# Patient Record
Sex: FEMALE | Race: ASIAN | URBAN - METROPOLITAN AREA
[De-identification: names, ages, dates, MRNs, and addresses within clinical notes are randomized per-mention and may not be internally consistent; named-entity substitution may affect disease eponyms.]

---
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
## 2025-03-07 ENCOUNTER — TRANSFERRED RECORDS (OUTPATIENT)
Dept: HEALTH INFORMATION MANAGEMENT | Facility: CLINIC | Age: 25
End: 2025-03-07

## 2025-03-14 ENCOUNTER — TRANSFERRED RECORDS (OUTPATIENT)
Dept: HEALTH INFORMATION MANAGEMENT | Facility: CLINIC | Age: 25
End: 2025-03-14

## 2025-03-26 ENCOUNTER — MEDICAL CORRESPONDENCE (OUTPATIENT)
Dept: HEALTH INFORMATION MANAGEMENT | Facility: CLINIC | Age: 25
End: 2025-03-26
Payer: COMMERCIAL

## 2025-03-27 ENCOUNTER — TRANSCRIBE ORDERS (OUTPATIENT)
Dept: OTHER | Age: 25
End: 2025-03-27

## 2025-03-27 DIAGNOSIS — B25.9 CMV COLITIS (H): Primary | ICD-10-CM

## 2025-03-27 DIAGNOSIS — A08.39 CMV COLITIS (H): Primary | ICD-10-CM

## 2025-03-27 DIAGNOSIS — B25.9 CYTOMEGALOVIRAL DISEASE, UNSPECIFIED (H): ICD-10-CM

## 2025-03-31 ENCOUNTER — TRANSFERRED RECORDS (OUTPATIENT)
Dept: HEALTH INFORMATION MANAGEMENT | Facility: CLINIC | Age: 25
End: 2025-03-31
Payer: COMMERCIAL

## 2025-03-31 NOTE — CONFIDENTIAL NOTE
DIAGNOSIS:    CMV colitis (H)   Cytomegaloviral disease, unspecified      DATE RECEIVED:  04.22.2025    NOTES (Gather within 2 years) STATUS DETAILS   OFFICE NOTE from referring provider   In process Kimani Stokes MD  [facility]    OFFICE NOTE from other specialist     DISCHARGE SUMMARY from hospital     DISCHARGE REPORT from the ER     LABS (any labs) Care Everywhere    MEDICATION LIST     IMAGING  (NEED IMAGES AND REPORTS)     Osteomyelitis: Foot imaging      Liver Abscess: Abdominal imaging     Other (anything related to diagnoses

## 2025-04-22 ENCOUNTER — OFFICE VISIT (OUTPATIENT)
Dept: INFECTIOUS DISEASES | Facility: CLINIC | Age: 25
End: 2025-04-22
Attending: STUDENT IN AN ORGANIZED HEALTH CARE EDUCATION/TRAINING PROGRAM
Payer: COMMERCIAL

## 2025-04-22 ENCOUNTER — PRE VISIT (OUTPATIENT)
Dept: INFECTIOUS DISEASES | Facility: CLINIC | Age: 25
End: 2025-04-22
Payer: COMMERCIAL

## 2025-04-22 VITALS
WEIGHT: 109 LBS | DIASTOLIC BLOOD PRESSURE: 57 MMHG | HEART RATE: 56 BPM | BODY MASS INDEX: 18.16 KG/M2 | SYSTOLIC BLOOD PRESSURE: 93 MMHG | HEIGHT: 65 IN | OXYGEN SATURATION: 100 %

## 2025-04-22 DIAGNOSIS — A08.39 CMV COLITIS (H): Primary | ICD-10-CM

## 2025-04-22 DIAGNOSIS — K50.911 CROHN'S DISEASE WITH RECTAL BLEEDING, UNSPECIFIED GASTROINTESTINAL TRACT LOCATION (H): ICD-10-CM

## 2025-04-22 DIAGNOSIS — B25.9 CMV COLITIS (H): Primary | ICD-10-CM

## 2025-04-22 DIAGNOSIS — Z91.018 FOOD ALLERGY: ICD-10-CM

## 2025-04-22 PROCEDURE — 3074F SYST BP LT 130 MM HG: CPT | Performed by: STUDENT IN AN ORGANIZED HEALTH CARE EDUCATION/TRAINING PROGRAM

## 2025-04-22 PROCEDURE — 99213 OFFICE O/P EST LOW 20 MIN: CPT | Performed by: STUDENT IN AN ORGANIZED HEALTH CARE EDUCATION/TRAINING PROGRAM

## 2025-04-22 PROCEDURE — 3078F DIAST BP <80 MM HG: CPT | Performed by: STUDENT IN AN ORGANIZED HEALTH CARE EDUCATION/TRAINING PROGRAM

## 2025-04-22 PROCEDURE — 99204 OFFICE O/P NEW MOD 45 MIN: CPT | Performed by: STUDENT IN AN ORGANIZED HEALTH CARE EDUCATION/TRAINING PROGRAM

## 2025-04-22 NOTE — PROGRESS NOTES
[facility]  General Infectious Disease Initial Consult Note     Patient:  Zeenat Padilla, Date of birth 2000, Medical record number [id number]  Date of Visit:  April 22, 2025  Consult Requested by: [facility]  Reason for Consult: CMV colitis         Assessment and Recommendations:   Problem List:  CMV colitis  Crohn's disease -currently not on medication  Arthritis of unknown etiology    Impression: Zeenat Padilla is a 25 yo F with new diagnosis of crohn's disease, not currently on medication who presented with severe abdominal pain -found to have CMV inclusions on pathology. Also with severe arthritis that may be related to Crohn's disease.     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 She has completed 3 weeks of valgancyclovir and her abdominal pain has resolved. Would obtain CMV VL to confirm that she no longer has infection. If positive, would need pregnancy test, restart valgancyclovir, and weekly CMV VL monitoring with CBC and Chem 7.     It's not clear to me why she developed CMV colitis. I assume this is secondary reactivation, but she wasn't on prednisone longterm to precipitate this type of infection. No fevers, chills, or infectious signs/symptoms beyond her abdominal pain and arthritis. No sick contact, or other known exposures.     Finally, she has noted a worsening of her arthritis symptoms in relation to food. Specifically, she mentions that within 30 minutes of eating certain types of foods, her joint symptoms worsen, but then self-resolve. I encouraged her to keep a food diary to notice patterns about specific triggers. I will refer her to allergy for further consideration.       Recommendations:  Obtain CMV Viral load -if negative, no further testing. If positive, would restart Valgancyclovir and monitor her weekly with viral load, CBC, Chem 7  Food allergy referral per her request      Attestation:  I have reviewed today's vital signs, medications, labs and  imaging.  Gemma Villagomez MD  Pager [phone]         History of Present Illness:       Zeenat Padilla is a 23 yo F who was referred to ID clinic after a recent diagnosis of CMV colitis.     She has had symptoms of rectal bleeding for approximately 1 year. Work up has included negative testing for Celiac. She also has symptoms of swelling in her dorsal hand that would last ~3 days. This symptoms would come on and off every 1-2 months, and then self-resolve.     In November 2024 she had swelling of her fingers and she presented to her PCP. In January 2025 she had severe swelling of her hands, feet, knees, and feet, that was so bad she couldn't get her shoes on. She couldn't close her hand, it was so swollen.     Along with this she developed severe abdominal pain, which she describes as like menstrual cramps but 100x worse. She believes the pain was in the lower and central abdomen. She had a colonoscopy on 3/7 that was suggestive of crohn's disease.     Specifically, path was notable for patchy moderately active chronic colitis consistent with Crohn's disease. Cecum and rectal biopsies showed high density of CMV infected cells (>5 infected cells).     She was started on Valgancyclovir on 3/14 for 3 weeks, and she has completed that course. Her abdominal pain has since resolved, around the time that she completed the valgancyclovir. She had weekly labs while on valgancyclovir. She can show me lab results on her phone (4/10: AST/ALT normal, WBC 5.9). No CMV VL was done (from what I can see). Unfortunately I am unable to see her outside labs done at [facility].     She was started on Mesalamine after her colonoscopy, but stopped it on April 7th. Due to severe joint swelling, she started prednisone on April 8th, 9th, 10th, but she stopped due to no improvement. Thus, at this point, she is not on any medications.     She feels that her symptoms may be related to food. She thinks sometimes after eating sushi she has joint  swelling for 30 minutes that self-resolves. She is requesting an allergy referral.            Review of Systems:   CONSTITUTIONAL:  No fevers or chills  EYES: negative for icterus  ENT:  negative for oral lesions, hearing loss, tinnitus and sore throat  RESPIRATORY:  negative for cough and dyspnea  CARDIOVASCULAR:  negative for chest pain, palpitations  GASTROINTESTINAL:  negative for nausea, vomiting, diarrhea and constipation  GENITOURINARY:  negative for dysuria  HEME:  No easy bruising/bleeding  INTEGUMENT:  negative for rash and pruritus  NEURO:  Negative for headache       Past Medical History:   No past medical history on file.  None      Allergies:      Allergies   Allergen Reactions    Amoxicillin Hives    Azithromycin Hives    Penicillins             Current Antimicrobials:     None       Family History:     Family History   Problem Relation Age of Onset    Glaucoma No family hx of     Macular Degeneration No family hx of      No family history of autoimmune conditions or recurrent ifnections       Social History:     Social History     Socioeconomic History    Marital status: Single     Spouse name: Not on file    Number of children: Not on file    Years of education: Not on file    Highest education level: Not on file   Occupational History    Not on file   Tobacco Use    Smoking status: Never    Smokeless tobacco: Not on file   Substance and Sexual Activity    Alcohol use: Not on file    Drug use: Not on file    Sexual activity: Not on file   Other Topics Concern    Not on file   Social History Narrative    Not on file     Social Drivers of Health     Financial Resource Strain: Low Risk  (11/2/2024)    Received from [employer] [facility]    Financial Resource Strain     Difficulty of Paying Living Expenses: 3     Difficulty of Paying Living Expenses: Not on file   Food Insecurity: No Food Insecurity (11/2/2024)    Received from [employer] [facility]    Food  Insecurity     Do you worry your food will run out before you are able to buy more?: 1   Transportation Needs: No Transportation Needs (11/2/2024)    Received from [employer] [facility]    Transportation Needs     Does lack of transportation keep you from medical appointments?: 1     Does lack of transportation keep you from work, meetings or getting things that you need?: 1   Physical Activity: Not on file   Stress: Not on file   Social Connections: Socially Isolated (11/2/2024)    [facility]    Social Connections     Do you often feel lonely or isolated from those around you?: 4   Interpersonal Safety: Not on file   Housing Stability: Low Risk  (11/2/2024)    [facility]    Housing Stability     What is your housing situation today?: 1     She used to work at [employer], 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 She lives in [place] with her parents and her uncle. No pets. Non smoker, no alcohol. She is sexually active with 1 male partner.   No travel outside of the country.          Physical Exam:   Ranges forvital signs:  Pulse:  [56] 56  BP: (93)/(57) 93/57  SpO2:  [100 %] 100 %  [unfilled]    Exam:  GENERAL:  well-developed, well-nourished, in no acute distress.   ENT:  Head is normocephalic, atraumatic. Oropharynx is moist without exudates or ulcers.  EYES:  Eyes have anicteric sclerae.  PERRL.  NECK:  Supple. No cervical lymphadenopathy.   LUNGS:  Clear to auscultation bilaterally. No wheezes or crackles  CARDIOVASCULAR:  Regular rate and rhythm with no murmurs, gallops or rubs.  ABDOMEN:  Normal bowel sounds, soft, nontender. No hepatosplenomegaly.   EXT: Extremities warm and without edema. R achilles nodule present, she says this has arisen in the last 24 hours.   SKIN:  No acute rashes.    NEUROLOGIC:  Grossly  "nonfocal.           Laboratory Data:   No results found for: \"CR\", \"WBC\", \"HGB\", \"PLT\"  No results found for: \"NA\", \"CREATININE\", \"BUN\", \"CO2\"    2/10 UA normal  2/10 GC chlamydia urine probe negative  2/10 Quantiferon TB negative  Hepatitis B SAg, Core Ab negative  Hep C Ab negative  CRP <3    1/16 Lyme negative, TPA Ab negative    2/26/24 HIV Ab negative          "

## 2025-04-22 NOTE — LETTER
4/22/2025       RE: Zeenat Padilla  [address]  [place] [address]     Dear Colleague,    Thank you for referring your patient, Zeenat Padilla, to the [facility] INFECTIOUS DISEASE CLINIC [place] at [facility]. Please see a copy of my visit note below.    [facility]  General Infectious Disease Initial Consult Note     Patient:  Zeenat Padilla, Date of birth 2000, Medical record number [id number]  Date of Visit:  April 22, 2025  Consult Requested by: [facility]  Reason for Consult: CMV colitis         Assessment and Recommendations:   Problem List:  CMV colitis  Crohn's disease -currently not on medication  Arthritis of unknown etiology    Impression: Zeenat Padilla is a 25 yo F with new diagnosis of crohn's disease, not currently on medication who presented with severe abdominal pain -found to have CMV inclusions on pathology. Also with severe arthritis that may be related to Crohn's disease.     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 She has completed 3 weeks of valgancyclovir and her abdominal pain has resolved. Would obtain CMV VL to confirm that she no longer has infection. If positive, would need pregnancy test, restart valgancyclovir, and weekly CMV VL monitoring with CBC and Chem 7.     It's not clear to me why she developed CMV colitis. I assume this is secondary reactivation, but she wasn't on prednisone longterm to precipitate this type of infection. No fevers, chills, or infectious signs/symptoms beyond her abdominal pain and arthritis. No sick contact, or other known exposures.     Finally, she has noted a worsening of her arthritis symptoms in relation to food. Specifically, she mentions that within 30 minutes of eating certain types of foods, her joint symptoms worsen, but then self-resolve. I encouraged her to keep a food diary to notice patterns about specific triggers. I will refer her to allergy for further  consideration.       Recommendations:  Obtain CMV Viral load -if negative, no further testing. If positive, would restart Valgancyclovir and monitor her weekly with viral load, CBC, Chem 7  Food allergy referral per her request      Attestation:  I have reviewed today's vital signs, medications, labs and imaging.  Gemma Villagomez MD  Pager [phone]         History of Present Illness:       Zeenat Padilla is a 23 yo F who was referred to ID clinic after a recent diagnosis of CMV colitis.     She has had symptoms of rectal bleeding for approximately 1 year. Work up has included negative testing for Celiac. She also has symptoms of swelling in her dorsal hand that would last ~3 days. This symptoms would come on and off every 1-2 months, and then self-resolve.     In November 2024 she had swelling of her fingers and she presented to her PCP. In January 2025 she had severe swelling of her hands, feet, knees, and feet, that was so bad she couldn't get her shoes on. She couldn't close her hand, it was so swollen.     Along with this she developed severe abdominal pain, which she describes as like menstrual cramps but 100x worse. She believes the pain was in the lower and central abdomen. She had a colonoscopy on 3/7 that was suggestive of crohn's disease.     Specifically, path was notable for patchy moderately active chronic colitis consistent with Crohn's disease. Cecum and rectal biopsies showed high density of CMV infected cells (>5 infected cells).     She was started on Valgancyclovir on 3/14 for 3 weeks, and she has completed that course. Her abdominal pain has since resolved, around the time that she completed the valgancyclovir. She had weekly labs while on valgancyclovir. She can show me lab results on her phone (4/10: AST/ALT normal, WBC 5.9). No CMV VL was done (from what I can see). Unfortunately I am unable to see her outside labs done at [facility].     She was started on Mesalamine after her colonoscopy, but  stopped it on April 7th. Due to severe joint swelling, she started prednisone on April 8th, 9th, 10th, but she stopped due to no improvement. Thus, at this point, she is not on any medications.     She feels that her symptoms may be related to food. She thinks sometimes after eating sushi she has joint swelling for 30 minutes that self-resolves. She is requesting an allergy referral.            Review of Systems:   CONSTITUTIONAL:  No fevers or chills  EYES: negative for icterus  ENT:  negative for oral lesions, hearing loss, tinnitus and sore throat  RESPIRATORY:  negative for cough and dyspnea  CARDIOVASCULAR:  negative for chest pain, palpitations  GASTROINTESTINAL:  negative for nausea, vomiting, diarrhea and constipation  GENITOURINARY:  negative for dysuria  HEME:  No easy bruising/bleeding  INTEGUMENT:  negative for rash and pruritus  NEURO:  Negative for headache       Past Medical History:   No past medical history on file.  None      Allergies:      Allergies   Allergen Reactions     Amoxicillin Hives     Azithromycin Hives     Penicillins             Current Antimicrobials:     None       Family History:     Family History   Problem Relation Age of Onset     Glaucoma No family hx of      Macular Degeneration No family hx of      No family history of autoimmune conditions or recurrent ifnections       Social History:     Social History     Socioeconomic History     Marital status: Single     Spouse name: Not on file     Number of children: Not on file     Years of education: Not on file     Highest education level: Not on file   Occupational History     Not on file   Tobacco Use     Smoking status: Never     Smokeless tobacco: Not on file   Substance and Sexual Activity     Alcohol use: Not on file     Drug use: Not on file     Sexual activity: Not on file   Other Topics Concern     Not on file   Social History Narrative     Not on file     Social Drivers of Health     Financial Resource Strain: Low Risk   (11/2/2024)    [facility]    Financial Resource Strain      Difficulty of Paying Living Expenses: 3      Difficulty of Paying Living Expenses: Not on file   Food Insecurity: No Food Insecurity (11/2/2024)    Received from [facility] [employer] [facility]    Food Insecurity      Do you worry your food will run out before you are able to buy more?: 1   Transportation Needs: No Transportation Needs (11/2/2024)    Received from [facility] [employer] [facility]    Transportation Needs      Does lack of transportation keep you from medical appointments?: 1      Does lack of transportation keep you from work, meetings or getting things that you need?: 1   Physical Activity: Not on file   Stress: Not on file   Social Connections: Socially Isolated (11/2/2024)    Received from [facility] [employer] [facility] [employer] [facility]    Social Connections      Do you often feel lonely or isolated from those around you?: 4   Interpersonal Safety: Not on file   Housing Stability: Low Risk  (11/2/2024)    Received from [facility] [employer] [facility]    Housing Stability      What is your housing situation today?: 1     She used to work at [employer], 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 She lives in [place] with her parents and her uncle. No pets. Non smoker, no alcohol. She is sexually active with 1 male partner.   No travel outside of the country.          Physical Exam:   Ranges forvital signs:  Pulse:  [56] 56  BP: (93)/(57) 93/57  SpO2:  [100 %] 100 %  [unfilled]    Exam:  GENERAL:  well-developed, well-nourished, in no acute distress.   ENT:  Head is normocephalic, atraumatic. Oropharynx is moist without exudates or ulcers.  EYES:  Eyes have anicteric sclerae.  PERRL.  NECK:  Supple. No cervical lymphadenopathy.   LUNGS:  Clear to auscultation bilaterally. No wheezes or  "crackles  CARDIOVASCULAR:  Regular rate and rhythm with no murmurs, gallops or rubs.  ABDOMEN:  Normal bowel sounds, soft, nontender. No hepatosplenomegaly.   EXT: Extremities warm and without edema. R achilles nodule present, she says this has arisen in the last 24 hours.   SKIN:  No acute rashes.    NEUROLOGIC:  Grossly nonfocal.           Laboratory Data:   No results found for: \"CR\", \"WBC\", \"HGB\", \"PLT\"  No results found for: \"NA\", \"CREATININE\", \"BUN\", \"CO2\"    2/10 UA normal  2/10 GC chlamydia urine probe negative  2/10 Quantiferon TB negative  Hepatitis B SAg, Core Ab negative  Hep C Ab negative  CRP <3    1/16 Lyme negative, TPA Ab negative    2/26/24 HIV Ab negative            Again, thank you for allowing me to participate in the care of your patient.      Sincerely,    Gemma Villagomez MD    "

## 2025-04-22 NOTE — NURSING NOTE
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

## 2025-04-23 ENCOUNTER — PATIENT OUTREACH (OUTPATIENT)
Dept: CARE COORDINATION | Facility: CLINIC | Age: 25
End: 2025-04-23
Payer: COMMERCIAL

## 2025-05-16 ENCOUNTER — TRANSFERRED RECORDS (OUTPATIENT)
Dept: HEALTH INFORMATION MANAGEMENT | Facility: CLINIC | Age: 25
End: 2025-05-16

## 2025-07-14 ENCOUNTER — OFFICE VISIT (OUTPATIENT)
Dept: ALLERGY | Facility: CLINIC | Age: 25
End: 2025-07-14
Attending: STUDENT IN AN ORGANIZED HEALTH CARE EDUCATION/TRAINING PROGRAM
Payer: COMMERCIAL

## 2025-07-14 VITALS
WEIGHT: 106 LBS | DIASTOLIC BLOOD PRESSURE: 56 MMHG | OXYGEN SATURATION: 99 % | HEART RATE: 57 BPM | BODY MASS INDEX: 17.64 KG/M2 | SYSTOLIC BLOOD PRESSURE: 93 MMHG

## 2025-07-14 DIAGNOSIS — Z91.018 FOOD ALLERGY: ICD-10-CM

## 2025-07-14 DIAGNOSIS — T78.1XXA ADVERSE FOOD REACTION, INITIAL ENCOUNTER: Primary | ICD-10-CM

## 2025-07-14 PROCEDURE — 99202 OFFICE O/P NEW SF 15 MIN: CPT | Performed by: INTERNAL MEDICINE

## 2025-07-14 PROCEDURE — 3078F DIAST BP <80 MM HG: CPT | Performed by: INTERNAL MEDICINE

## 2025-07-14 PROCEDURE — 3074F SYST BP LT 130 MM HG: CPT | Performed by: INTERNAL MEDICINE

## 2025-07-14 RX ORDER — ADALIMUMAB-ATTO 40 MG/.4ML
40 INJECTION SUBCUTANEOUS
COMMUNITY
Start: 2025-05-16

## 2025-07-14 NOTE — LETTER
7/14/2025      Zeenat Padilla  [address]  [place] [address]      Dear Colleague,    Thank you for referring your patient, Zeenat Padilla, to the [facility] [place]. Please see a copy of my visit note below.    Zeenat Padilla was seen in the Allergy Clinic at [facility].    Zeenat Padilla is a 24 year old female being seen today at the request of Gemma Villagomez MD/[facility] in consultation for Food allergy concerns.    She was referred by infectious disease.  She has a history of CMV colitis and was diagnosed with Crohn's within the last year and is on a biologic called Amjevita.  She was having symptoms with chocolate and fried food causing abdominal cramping which is severe which has improved since starting of the biologic.  Popcorn however has caused problems intermittently since starting Amjevita.    She is wondering if this is a food allergy and would like to have further evaluation.  She is wondering if other test could be performed for additional foods.  She is not noticing symptoms with additional foods.      No past medical history on file.  Family History   Problem Relation Age of Onset     Glaucoma No family hx of      Macular Degeneration No family hx of      No past surgical history on file.    ENVIRONMENTAL HISTORY:   Pets inside the house include None.  Do you smoke cigarettes or other recreational drugs? No There is/are 0 smokers living in the house. The house does not have a damp basement.     SOCIAL HISTORY:   Zeenat is employed as . She lives with her parents.      Review of Systems      Current Outpatient Medications:      AMJEVITA 40 MG/0.4ML auto-injector kit, Inject 40 mg subcutaneously every 14 days., Disp: , Rfl:   Allergies   Allergen Reactions     Amoxicillin Hives     Azithromycin Hives     Penicillins          EXAM:   BP 93/56   Pulse 57   Wt 48.1 kg (106 lb)   SpO2 99%    BMI 17.64 kg/m      Physical Exam    Constitutional:       General: She is not in acute distress.     Appearance: Normal appearance. She is not ill-appearing.   HENT:      Head: Normocephalic and atraumatic.    Neurological:      General: No focal deficit present.      Mental Status: She is alert. Mental status is at baseline.   Psychiatric:         Mood and Affect: Mood normal.         Behavior: Behavior normal.      ASSESSMENT/PLAN:  Zeenat Padilla is a 24 year old female seen today for food allergy concerns.  Her symptoms do not sound classic for food allergy.  Will order blood testing for corn and chocolate since that is a couple foods that she is concerned about.  We discussed the differences between food allergy as well as food intolerances or sensitivity.    Labs    Follow-up as needed      Thank you for allowing me to participate in the care of Zeenat Padilla.      I spent 20 minutes on the date of the encounter doing chart review, history and exam, documentation and further coordination as noted above exclusive of separately reported interpretations    Kane Richter MD  Allergy/Immunology  [facility]      Again, thank you for allowing me to participate in the care of your patient.        Sincerely,        Kane Richter MD    Electronically signed

## 2025-07-14 NOTE — PROGRESS NOTES
Zeenat Padilla was seen in the Allergy Clinic at [facility].    Zeenat Padilla is a 24 year old female being seen today at the request of Gemma Villagomez MD/[facility] in consultation for Food allergy concerns.    She was referred by infectious disease.  She has a history of CMV colitis and was diagnosed with Crohn's within the last year and is on a biologic called Amjevita.  She was having symptoms with chocolate and fried food causing abdominal cramping which is severe which has improved since starting of the biologic.  Popcorn however has caused problems intermittently since starting Amjevita.    She is wondering if this is a food allergy and would like to have further evaluation.  She is wondering if other test could be performed for additional foods.  She is not noticing symptoms with additional foods.      No past medical history on file.  Family History   Problem Relation Age of Onset    Glaucoma No family hx of     Macular Degeneration No family hx of      No past surgical history on file.    ENVIRONMENTAL HISTORY:   Pets inside the house include None.  Do you smoke cigarettes or other recreational drugs? No There is/are 0 smokers living in the house. The house does not have a damp basement.     SOCIAL HISTORY:   Zeenat is employed as . She lives with her parents.      Review of Systems      Current Outpatient Medications:     AMJEVITA 40 MG/0.4ML auto-injector kit, Inject 40 mg subcutaneously every 14 days., Disp: , Rfl:   Allergies   Allergen Reactions    Amoxicillin Hives    Azithromycin Hives    Penicillins          EXAM:   BP 93/56   Pulse 57   Wt 48.1 kg (106 lb)   SpO2 99%   BMI 17.64 kg/m      Physical Exam    Constitutional:       General: She is not in acute distress.     Appearance: Normal appearance. She is not ill-appearing.   HENT:      Head: Normocephalic and atraumatic.    Neurological:      General: No  focal deficit present.      Mental Status: She is alert. Mental status is at baseline.   Psychiatric:         Mood and Affect: Mood normal.         Behavior: Behavior normal.      ASSESSMENT/PLAN:  Zeenat Padilla is a 24 year old female seen today for food allergy concerns.  Her symptoms do not sound classic for food allergy.  Will order blood testing for corn and chocolate since that is a couple foods that she is concerned about.  We discussed the differences between food allergy as well as food intolerances or sensitivity.    Labs    Follow-up as needed      Thank you for allowing me to participate in the care of Zeenat Padilla.      I spent 20 minutes on the date of the encounter doing chart review, history and exam, documentation and further coordination as noted above exclusive of separately reported interpretations    Kane Richter MD  Allergy/Immunology  [facility]

## 2025-07-17 ENCOUNTER — LAB (OUTPATIENT)
Dept: LAB | Facility: CLINIC | Age: 25
End: 2025-07-17
Payer: COMMERCIAL

## 2025-07-17 DIAGNOSIS — T78.1XXA ADVERSE FOOD REACTION, INITIAL ENCOUNTER: ICD-10-CM

## 2025-07-19 ENCOUNTER — HEALTH MAINTENANCE LETTER (OUTPATIENT)
Age: 25
End: 2025-07-19

## 2025-07-21 LAB
CHOCOLATE IGE QN: <0.1 KU(A)/L
CORN IGE QN: <0.1 KU(A)/L